# Patient Record
(demographics unavailable — no encounter records)

---

## 2024-10-23 NOTE — HISTORY OF PRESENT ILLNESS
[FreeTextEntry1] : patient presents for blood pressure check  [de-identified] : He reports that he is feeling well, just still has the pain in his feet.

## 2024-10-23 NOTE — PLAN
[FreeTextEntry1] : The lab results from our prior visit were reviewed again and for the stable hypertension he will continue taking losartan 50 mg daily and metoprolol ER 50 mg daily.  For the stable leg swelling he will continue taking furosemide 20 mg daily and potassium 10 mEq daily.  For the stable acid reflux, he will continue taking omeprazole 40 mg daily.  Labs were ordered and will be drawn in the office today to follow-up on his health conditions.  A flu vaccine was administered, and he will follow-up in 4 months or sooner if needed.

## 2025-03-31 NOTE — PLAN
[FreeTextEntry1] : The lab results from our prior visit were reviewed again.  I ordered an xray of the right ribs and a GI consultation for a repeat colonoscopy and endoscopy.  For the stable hypertension he will continue taking Losartan 50 mg daily, metoprolol ER 50 mg daily, furosemide 20 mg daily and potassium chloride 10 meq daily.  He was advised to follow up in 4 months for a well visit or sooner prn.  Labs were ordered and will be drawn in the office today to follow up on his health conditions.

## 2025-03-31 NOTE — REVIEW OF SYSTEMS
----- Message from Glenys Yuval sent at 8/9/2022  1:59 PM CDT -----  Contact: EDDY BENDER [[id number]]  Type: Call Back      Who called: EDDY BENDER [[id number]]      What is the request in detail: Patient is requesting a call back. Pt states that it is flooded on [address] and she will be a few minutes late. Please advise.       Can the clinic reply by [facility]? No      Would the patient rather a call back or a response via [facility]? Call back       Best call back number: [phone] (mobile)      Additional Information:        [Recent Change In Weight] : ~T recent weight change [Negative] : Heme/Lymph [FreeTextEntry9] : pain in right rib area

## 2025-03-31 NOTE — PHYSICAL EXAM
[No Acute Distress] : no acute distress [Well Nourished] : well nourished [Well Developed] : well developed [Well-Appearing] : well-appearing [Normal Voice/Communication] : normal voice/communication [No Respiratory Distress] : no respiratory distress  [No Accessory Muscle Use] : no accessory muscle use [Clear to Auscultation] : lungs were clear to auscultation bilaterally [Normal Rate] : normal rate  [Regular Rhythm] : with a regular rhythm [Normal S1, S2] : normal S1 and S2 [Normal Mood] : the mood was normal [de-identified] : tenderness on palpation of right lateral rib area

## 2025-03-31 NOTE — HISTORY OF PRESENT ILLNESS
[FreeTextEntry1] : patient presents for medication  drives a truck for a living  sides hurt almost under the arm  wants to get an X-ray  pain level: pulling in and out of the truck and getting out of bed (6)  hasn't taken any medication for the pain  patient states he got the flu shot  colon -  N/A needs another one (doesn't need a script )  [de-identified] : He reports having ongoing sharp pain under the right arm.  He denies any specific injury.  He has been following a healthy diet and has lost weight.